# Patient Record
Sex: FEMALE | Race: WHITE | URBAN - METROPOLITAN AREA
[De-identification: names, ages, dates, MRNs, and addresses within clinical notes are randomized per-mention and may not be internally consistent; named-entity substitution may affect disease eponyms.]

---

## 2023-03-20 ENCOUNTER — APPOINTMENT (RX ONLY)
Dept: URBAN - METROPOLITAN AREA CLINIC 16 | Facility: CLINIC | Age: 78
Setting detail: DERMATOLOGY
End: 2023-03-20

## 2023-03-20 DIAGNOSIS — L57.0 ACTINIC KERATOSIS: ICD-10-CM

## 2023-03-20 PROBLEM — D48.5 NEOPLASM OF UNCERTAIN BEHAVIOR OF SKIN: Status: ACTIVE | Noted: 2023-03-20

## 2023-03-20 PROCEDURE — ? FULL BODY SKIN EXAM - DECLINED

## 2023-03-20 PROCEDURE — ? BIOPSY BY SHAVE METHOD

## 2023-03-20 PROCEDURE — 11102 TANGNTL BX SKIN SINGLE LES: CPT | Mod: 59

## 2023-03-20 PROCEDURE — 69100 BIOPSY OF EXTERNAL EAR: CPT

## 2023-03-20 ASSESSMENT — LOCATION SIMPLE DESCRIPTION DERM: LOCATION SIMPLE: RIGHT EAR

## 2023-03-20 ASSESSMENT — LOCATION DETAILED DESCRIPTION DERM: LOCATION DETAILED: RIGHT ANTIHELIX

## 2023-03-20 ASSESSMENT — LOCATION ZONE DERM: LOCATION ZONE: EAR

## 2023-03-20 NOTE — PROCEDURE: BIOPSY BY SHAVE METHOD

## 2023-03-20 NOTE — PROCEDURE: FULL BODY SKIN EXAM - DECLINED
Detail Level: Detailed
Body Of Note (Please Add Your Own Text Here): encouraged annual skin exams
Price (Do Not Change): 0.00
Instructions: This plan will send the code FBSD to the PM system.  DO NOT or CHANGE the price.

## 2023-06-02 ENCOUNTER — APPOINTMENT (RX ONLY)
Dept: URBAN - METROPOLITAN AREA CLINIC 12 | Facility: CLINIC | Age: 78
Setting detail: DERMATOLOGY
End: 2023-06-02

## 2023-06-02 DIAGNOSIS — Z02.9 ENCOUNTER FOR ADMINISTRATIVE EXAMINATIONS, UNSPECIFIED: ICD-10-CM

## 2024-01-11 ENCOUNTER — APPOINTMENT (RX ONLY)
Dept: URBAN - METROPOLITAN AREA CLINIC 16 | Facility: CLINIC | Age: 79
Setting detail: DERMATOLOGY
End: 2024-01-11

## 2024-01-11 PROBLEM — C44.319 BASAL CELL CARCINOMA OF SKIN OF OTHER PARTS OF FACE: Status: ACTIVE | Noted: 2024-01-11

## 2024-01-11 PROBLEM — C44.212 BASAL CELL CARCINOMA OF SKIN OF RIGHT EAR AND EXTERNAL AURICULAR CANAL: Status: ACTIVE | Noted: 2024-01-11

## 2024-01-11 PROCEDURE — ? DIAGNOSIS COMMENT

## 2024-01-11 PROCEDURE — ? COUNSELING

## 2024-01-11 PROCEDURE — 99212 OFFICE O/P EST SF 10 MIN: CPT

## 2024-01-11 NOTE — PROCEDURE: DIAGNOSIS COMMENT
Comment: Patient had biopsy 3/2023 was referred to and scheduled with Dr. Gonzales for Mohs.  Had transportation issues- came in for [facility] to [facility] location, though Mohs was in [place] the day she was schedule. \\n\\nReceived certified letter re: untreated BCC x 2 and here for a focused exam for further evaluation of these BX sites.  She is willing to schedule a TBSE . Now transportation issues resolved, requesting treatment and would like to schedule Mohs with Melecio Ricks and Debbie.\\nStaff faxed path reports to Dr.s ESPINOZA and NAHOMY
Render Risk Assessment In Note?: no
Detail Level: Simple
Comment: Basal Cell Carcinoma, Nodular Type | Location: right antihelix- BX proven (3-2023); DID not treat; REFER to DOMINIQUE for MOHS; staff faxed path | Biopsy Date: 2023/03/20 | Status: PENDING\\nBasal Cell Carcinoma | Location: left central mandibular cheek- PENDING-Refer to Z and B MOHS- path sent by staff | Biopsy Date: 2023/03/20 | Status: PENDING\\n\\nReceived certified letter and Scheduled re-eval of BX proven (3-2023) BCCs.  She states she did show up for MOHS to the [facility] office, though was at [place] site that day (too far).  She would like to be referred for MOHS to Dr. Mckeon for 2 pending BCC (nodular), requesting right ear be done first, if cannot do Mohs on both (same day).  She is aware it would likely be 2 separate appointment  for Mohs, but may discuss with their office. \\nSchedule TBSE Q 6 months

## 2024-01-11 NOTE — PROCEDURE: MIPS QUALITY
Quality 110: Preventive Care And Screening: Influenza Immunization: Influenza Immunization Administered during Influenza season
Detail Level: Detailed
Quality 226: Preventive Care And Screening: Tobacco Use: Screening And Cessation Intervention: Patient screened for tobacco use and is an ex/non-smoker
Quality 47: Advance Care Plan: Advance Care Planning discussed and documented; advance care plan or surrogate decision maker documented in the medical record.
Quality 130: Documentation Of Current Medications In The Medical Record: Current Medications Documented
Quality 431: Preventive Care And Screening: Unhealthy Alcohol Use - Screening: Patient not identified as an unhealthy alcohol user when screened for unhealthy alcohol use using a systematic screening method